# Patient Record
Sex: MALE | Race: AMERICAN INDIAN OR ALASKA NATIVE
[De-identification: names, ages, dates, MRNs, and addresses within clinical notes are randomized per-mention and may not be internally consistent; named-entity substitution may affect disease eponyms.]

---
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
## 2019-02-20 ENCOUNTER — HOSPITAL ENCOUNTER (OUTPATIENT)
Dept: HOSPITAL 5 - XRAY | Age: 19
Discharge: HOME | End: 2019-02-20
Attending: PEDIATRICS
Payer: MEDICAID

## 2019-02-20 DIAGNOSIS — M54.9: Primary | ICD-10-CM

## 2019-02-20 DIAGNOSIS — J45.909: ICD-10-CM

## 2019-02-20 PROCEDURE — 72081 X-RAY EXAM ENTIRE SPI 1 VW: CPT

## 2019-02-22 NOTE — XRAY REPORT
SCOLIOSIS SURVEY, ONE VIEW



History: Back pain.



Findings: No comparison. 12 rib-bearing thoracic vertebra and 5 lumbar 

vertebra are identified. No evidence for vertebral body anomaly or rib 

anomaly.



There is no measurable scoliosis throughout the thoracic and lumbar 

regions.



IMPRESSION: No significant scoliosis.

## 2020-04-18 ENCOUNTER — HOSPITAL ENCOUNTER (EMERGENCY)
Dept: HOSPITAL 5 - ED | Age: 20
Discharge: HOME | End: 2020-04-18
Payer: MEDICAID

## 2020-04-18 VITALS — DIASTOLIC BLOOD PRESSURE: 93 MMHG | SYSTOLIC BLOOD PRESSURE: 118 MMHG

## 2020-04-18 DIAGNOSIS — J45.909: ICD-10-CM

## 2020-04-18 DIAGNOSIS — Z79.891: ICD-10-CM

## 2020-04-18 DIAGNOSIS — Z79.899: ICD-10-CM

## 2020-04-18 DIAGNOSIS — R21: Primary | ICD-10-CM

## 2020-04-18 PROCEDURE — 99282 EMERGENCY DEPT VISIT SF MDM: CPT

## 2020-04-18 NOTE — EMERGENCY DEPARTMENT REPORT
ED Rash HPI





- HPI


Chief Complaint: Skin Rash


Stated Complaint: POSS ALLERGIC REACTION


Time Seen by Provider: 04/18/20 03:12


Duration: 1 Day


Location: Upper Extremities


Rash Symptoms: Yes Itching, No Facial Swelling, No Tongue/Oral Swelling, No 

Breathing Difficulties, No Choking Sensation, No Wheezing/Dyspnea, No 

Blistering, No Fever, No Lightheaded, No Malaise, No Myalgias


Severity: mild





ED Review of Systems


ROS: 


Stated complaint: POSS ALLERGIC REACTION


Other details as noted in HPI





Comment: All other systems reviewed and negative





ED Past Medical Hx





- Past Medical History


Previous Medical History?: Yes


Hx Hypertension: No


Hx Asthma: Yes





- Surgical History


Past Surgical History?: No





- Social History


Smoking Status: Former Smoker


Substance Use Type: None





- Medications


Home Medications: 


                                Home Medications











 Medication  Instructions  Recorded  Confirmed  Last Taken  Type


 


Ibuprofen [Motrin] 400 mg PO Q8H PRN #20 tablet 09/13/14  Unknown Rx


 


Mometasone Furoate [Elocon] 1 applicatio TP QDAY #45 cream..g. 04/18/20  Unknown

 Rx


 


hydrOXYzine HCL [Atarax] 25 mg PO Q6HR PRN #20 tablet 04/18/20  Unknown Rx














Rash Exam





- Exam


General: 


Vital signs noted. No distress. Alert and acting appropriately.





HEENT: No Periorbital Edema, No Conjuctival Injection, No Chemosis, No Perioral 

Edema, No Tongue Edema, No Uvular Edema, No Compromised Airway, No Drooling


Lungs: Yes Good Air Exchange (Normal Breath Sounds), No Wheezes, No Ronchi, No 

Stridor, No Cough, No Labored Respirations, No Retractions, No Use of Accessory 

Muscles, No Other Abnormal Lung Sounds


Heart: Yes Regular, No Murmur


Skin: Yes Maculopapular Rash (To the hands primarily between the webbing and 

slightly to the right upper thigh), Yes Erythema, No Urticarial Rash, No 

Morbilliform rash, No Bulla(e), No Excoriations, No Weeping


Other: Positive: Abdomen Normal, Neurologic Normal, Musculoskeletal Normal





ED Course


                                   Vital Signs











  04/18/20





  02:50


 


Temperature 98.3 F


 


Pulse Rate 70


 


Respiratory 18





Rate 


 


Blood Pressure 118/93


 


O2 Sat by Pulse 98





Oximetry 











Critical care attestation.: 


If time is entered above; I have spent that time in minutes in the direct care 

of this critically ill patient, excluding procedure time.








ED Disposition


Clinical Impression: 


 Rash of hands





Disposition: DC-01 TO HOME OR SELFCARE


Is pt being admited?: No


Does the pt Need Aspirin: No


Condition: Stable


Instructions:  Acute Rash (ED)


Prescriptions: 


hydrOXYzine HCL [Atarax] 25 mg PO Q6HR PRN #20 tablet


 PRN Reason: Itching


Mometasone Furoate [Elocon] 1 applicatio TP QDAY #45 cream..g.


Referrals: 


[facility], [place] [Primary Care Provider] - 3-5 Days

## 2022-05-04 ENCOUNTER — HOSPITAL ENCOUNTER (EMERGENCY)
Dept: HOSPITAL 5 - ED | Age: 22
LOS: 1 days | Discharge: HOME | End: 2022-05-05
Payer: MEDICAID

## 2022-05-04 VITALS — DIASTOLIC BLOOD PRESSURE: 73 MMHG | SYSTOLIC BLOOD PRESSURE: 115 MMHG
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
## 2022-05-04 DIAGNOSIS — S60.00XA: Primary | ICD-10-CM
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
## 2022-05-04 DIAGNOSIS — S63.91XA: ICD-10-CM

## 2022-05-04 DIAGNOSIS — S60.311A: ICD-10-CM

## 2022-05-04 PROCEDURE — 99284 EMERGENCY DEPT VISIT MOD MDM: CPT

## 2022-05-04 NOTE — XRAY REPORT
RIGHT HAND 3 VIEW(S)



INDICATION / CLINICAL INFORMATION: injured R thumb



COMPARISON: None available.

 

FINDINGS:



BONES / JOINT(S): No acute fracture or subluxation.  



SOFT TISSUES: Soft tissue irregularity suggesting injury noted in the region of the interphalangeal j
oint. No radiopaque foreign bodies.



ADDITIONAL FINDINGS: None.



IMPRESSION:

1. No acute fracture. Soft tissue injury without evidence of radiopaque foreign body deposition..



Signer Name: Cheko Umaña II, MD 

Signed: 5/4/2022 10:30 PM

Workstation Name: [id number]

## 2022-05-05 NOTE — EMERGENCY DEPARTMENT REPORT
ED Upper Extremity Inj HPI





- General


Chief Complaint: Extremity Injury, Upper


Stated Complaint: RIGHT THUMB INJURY


Source: patient


Mode of arrival: Ambulatory


Limitations: No Limitations





- History of Present Illness


Initial Comments: 





Patient is a 21-year-old -American male with past medical history of 

asthma presents to the ED with complaint of acute onset persistent right thumb 

bleeding laceration wound and right hand pain after falling during a basketball 

practice and another player stepped on his right hand about 4 hours ago.  

Patient states that the pain is constant and persistent with any active range of

motion of the right hand.  Patient denies head or neck injuries, dizziness, 

syncope, seizures, nausea and vomiting, numbness and tingling or weakness of rig

ht hand.


MD Complaint: Injury to:: right, hand (right hand pain), finger (right thumb 

abrasion)


-: Sudden, hour(s) (4)


Other Extremity Injury: Fingers: Right (right thumb abrasion), Hand: Right 

(pain)


Other Injuries: none


Place: outdoors


Severity scale (0 -10): 7


Improves With: none


Worsens With: movement of extremity


Context: fall, direct blow, sports-related injury (basketball)


Associated Symptoms: denies other symptoms.  denies: weakness, numbness, neck 

pain, suspects foreign body, nausea/vomiting, heard/felt popping sensat





- Related Data


                                  Previous Rx's











 Medication  Instructions  Recorded  Last Taken  Type


 


Ibuprofen [Motrin] 400 mg PO Q8H PRN #20 tablet 14 Unknown Rx


 


Mometasone Furoate [Elocon] 1 applicatio TP QDAY #45 cream..g. 20 Unknown 

Rx


 


hydrOXYzine HCL [Atarax] 25 mg PO Q6HR PRN #20 tablet 20 Unknown Rx


 


Ibuprofen [Motrin] 600 mg PO Q8H PRN #30 tablet 22 Unknown Rx


 


cephALEXin [Keflex] 500 mg PO Q8HR #21 cap 22 Unknown Rx











                                    Allergies











Allergy/AdvReac Type Severity Reaction Status Date / Time


 


No Known Allergies Allergy   Verified 14 21:22














ED Review of Systems


ROS: 


Stated complaint: RIGHT THUMB INJURY


Other details as noted in HPI





Constitutional: denies: chills, fever


Eyes: denies: eye pain, eye discharge, vision change


ENT: denies: ear pain, throat pain


Respiratory: denies: cough, shortness of breath, wheezing


Cardiovascular: denies: chest pain, palpitations


Endocrine: no symptoms reported


Gastrointestinal: denies: abdominal pain, nausea, vomiting, diarrhea


Genitourinary: denies: urgency, dysuria


Musculoskeletal: arthralgia (Right hand pain and right thumb abrasion).  denies:

 back pain, joint swelling


Skin: other (right thumb abrasion).  denies: rash, lesions


Neurological: denies: headache, weakness, paresthesias


Psychiatric: denies: anxiety, depression


Hematological/Lymphatic: denies: easy bleeding, easy bruising





ED Past Medical Hx





- Past Medical History


Hx Hypertension: No


Hx Asthma: Yes





- Social History


Smoking Status: Never Smoker





- Medications


Home Medications: 


                                Home Medications











 Medication  Instructions  Recorded  Confirmed  Last Taken  Type


 


Ibuprofen [Motrin] 400 mg PO Q8H PRN #20 tablet 14  Unknown Rx


 


Mometasone Furoate [Elocon] 1 applicatio TP QDAY #45 cream..g. 20  Unknown

 Rx


 


hydrOXYzine HCL [Atarax] 25 mg PO Q6HR PRN #20 tablet 20  Unknown Rx


 


Ibuprofen [Motrin] 600 mg PO Q8H PRN #30 tablet 22  Unknown Rx


 


cephALEXin [Keflex] 500 mg PO Q8HR #21 cap 22  Unknown Rx














ED Physical Exam





- General


Limitations: No Limitations


General appearance: alert, in no apparent distress





- Head


Head exam: Present: atraumatic, normocephalic, normal inspection





- Eye


Eye exam: Present: normal appearance, PERRL, EOMI


Pupils: Present: normal accommodation





- ENT


ENT exam: Present: normal exam, normal orophraynx, mucous membranes moist, TM's 

normal bilaterally, normal external ear exam





- Neck


Neck exam: Present: normal inspection, full ROM.  Absent: tenderness





- Respiratory


Respiratory exam: Present: normal lung sounds bilaterally.  Absent: respiratory 

distress, wheezes, rales, rhonchi, chest wall tenderness, accessory muscle use, 

prolonged expiratory





- Cardiovascular


Cardiovascular Exam: Present: regular rate, normal rhythm, normal heart sounds. 

 Absent: systolic murmur, diastolic murmur, rubs, gallop





- GI/Abdominal


GI/Abdominal exam: Present: soft, normal bowel sounds.  Absent: distended, 

tenderness, rebound, hyperactive bowel sounds, hypoactive bowel sounds, 

organomegaly, mass





- Extremities Exam


Extremities exam: Present: normal inspection, full ROM, tenderness (Palpable 

right hand tenderness, right thumb tenderness due to a bleeding abrasion wound),

 normal capillary refill.  Absent: pedal edema, joint swelling, calf tenderness





- Back Exam


Back exam: Present: normal inspection, full ROM.  Absent: tenderness, CVA 

tenderness (R), CVA tenderness (L), muscle spasm, paraspinal tenderness, 

vertebral tenderness





- Neurological Exam


Neurological exam: Present: alert, oriented X3, CN II-XII intact, normal gait, 

reflexes normal





- Psychiatric


Psychiatric exam: Present: normal affect, normal mood





- Skin


Skin exam: Present: warm, dry, intact, normal color, abrasion (Distal right 

thumb abrasion wound).  Absent: rash





ED Course





                                   Vital Signs











  22





  21:57 21:59 02:01


 


Temperature  99.3 F 


 


Pulse Rate 69 74 


 


Respiratory  16 18





Rate   


 


Blood Pressure  115/73 





[Right]   


 


O2 Sat by Pulse 97 98 





Oximetry   














ED Medical Decision Making





- Radiology Data


Radiology results: report reviewed, image reviewed





[facility]  


                                     [address] [place] [address]  


 


                                            XRay Report   


                                               Signed  


 


Patient: JH DEVLIN  


R#: [id number]          


: 2000                                                                

Acct:[id number]      


 


Age/Sex: 21 / M                                                                

ADM Date: 22     


 


Loc: ED       


Attending Dr:   


 


 


Ordering Physician: LIS WYATT MD  


Date of Service: 22  


Procedure(s): XR hand 2V RT  


Accession Number(s): [id number]  


 


cc: ED MD EDMUNDO   


 


Fluoro Time In Minutes:   


 


RIGHT HAND 3 VIEW(S)  


 


 INDICATION / CLINICAL INFORMATION: injured R thumb  


 


 COMPARISON: None available.  


 


 FINDINGS:  


 


 BONES / JOINT(S): No acute fracture or subluxation.    


 


 SOFT TISSUES: Soft tissue irregularity suggesting injury noted in the region of

 the interphalangeal


joint. No radiopaque foreign bodies.  


 


 ADDITIONAL FINDINGS: None.  


 


 IMPRESSION:  


 1. No acute fracture. Soft tissue injury without evidence of radiopaque foreign

 body deposition..  


 


 Signer Name: Juan Jose Stephens II, MD   


 Signed: 2022 10:30 PM  


 Workstation Name: [id number]   


 


 


Transcribed By: BARRINGTON  


Dictated By: JUAN JOSE STEPHENS II, MD  


Electronically Authenticated By: JUAN JOSE STEPHENS II, MD    


Signed Date/Time: 22                                


 


 


 


DD/DT: 22                                                            

  


TD/TT:























- Medical Decision Making





This is a 21-year-old -American male with past medical history of asthma 

presents to the ED with complaint of acute onset persistent right thumb bleeding

 laceration wound and right hand pain after falling during a basketball practice

 and another player stepped on his right hand about 4 hours ago.  Patient states

 that the pain is constant and persistent with any active range of motion of the

 right hand.  In the ED, patient is alert and oriented x3 and is not in any 

distress.  Patient was treated for pain in the ED.  Right hand x-ray showed no 

acute fractures or subluxations.  The right thumb abrasion wound was cleaned 

extensively and closed with Dermabond.  The wound was then dressed appropriately

 and the patient's right hand was splinted with Velcro splint.  Patient was 

discharged home on pain medications and advised to follow-up with his primary 

care physician in 7 to 10 days for reevaluation or return to the ED immediately 

if symptoms get worse.





- Differential Diagnosis


Hand fracture; hand sprain; finger ablation; hand contusion


Critical care attestation.: 


If time is entered above; I have spent that time in minutes in the direct care 

of this critically ill patient, excluding procedure time.








ED Disposition


Clinical Impression: 


 Abrasion of right thumb, initial encounter





Sprain of right hand


Qualifiers:


 Encounter type: initial encounter Qualified Code(s): S63.91XA - Sprain of 

unspecified part of right wrist and hand, initial encounter





Contusion of right hand including fingers


Qualifiers:


 Encounter type: initial encounter Qualified Code(s): S60.221A - Contusion of 

right hand, initial encounter; S60.00XA - Contusion of unspecified finger 

without damage to nail, initial encounter





Disposition: 01 HOME / SELF CARE / HOMELESS


Is pt being admited?: No


Does the pt Need Aspirin: No


Condition: Stable


Instructions:  Finger Sprain, Adult, Easy-to-Read, Abrasion, Easy-to-Read, Hand 

Contusion, Easy-to-Read


Additional Instructions: 


The right hand x-ray showed no acute fractures or subluxations.  Take medication

 with food, drink plenty of fluids and follow-up with your primary care 

physician in 7 to 10 days for reevaluation.  Return to the ED immediately if 

symptoms get worse.


Prescriptions: 


cephALEXin [Keflex] 500 mg PO Q8HR #21 cap


Ibuprofen [Motrin] 600 mg PO Q8H PRN #30 tablet


 PRN Reason: Pain


Referrals: 


JULIO CÉSAR LOBO MD [Primary Care Provider] - 3-5 Days


Forms:  Work/School Release Form(ED)


Time of Disposition: 04:14


Print Language: ENGLISH